# Patient Record
Sex: FEMALE | Race: BLACK OR AFRICAN AMERICAN | Employment: FULL TIME | URBAN - METROPOLITAN AREA
[De-identification: names, ages, dates, MRNs, and addresses within clinical notes are randomized per-mention and may not be internally consistent; named-entity substitution may affect disease eponyms.]

---

## 2024-11-18 ENCOUNTER — OFFICE VISIT (OUTPATIENT)
Age: 34
End: 2024-11-18

## 2024-11-18 VITALS
WEIGHT: 198.1 LBS | OXYGEN SATURATION: 98 % | TEMPERATURE: 98.2 F | SYSTOLIC BLOOD PRESSURE: 150 MMHG | DIASTOLIC BLOOD PRESSURE: 98 MMHG | HEIGHT: 63 IN | BODY MASS INDEX: 35.1 KG/M2 | HEART RATE: 94 BPM

## 2024-11-18 DIAGNOSIS — I10 ELEVATED BLOOD PRESSURE READING IN OFFICE WITH DIAGNOSIS OF HYPERTENSION: ICD-10-CM

## 2024-11-18 DIAGNOSIS — Z02.1 PRE-EMPLOYMENT HEALTH SCREENING EXAMINATION: Primary | ICD-10-CM

## 2024-11-18 ASSESSMENT — ENCOUNTER SYMPTOMS
BACK PAIN: 0
SHORTNESS OF BREATH: 0
CHEST TIGHTNESS: 0

## 2024-11-19 NOTE — PROGRESS NOTES
Janel Jensen (:  1990) is a 34 y.o. female,New patient, here for evaluation of the following chief complaint(s):  Employment Physical      ASSESSMENT/PLAN:    ICD-10-CM    1. Pre-employment health screening examination  Z02.1       2. Elevated blood pressure reading in office with diagnosis of hypertension  I10 Non [facility] - External Referral To Primary Care        Patient had a pre-employment physical performed while in the clinic today. Proof of tdap and MMR shown. History of HTN. BP is elevated at this time, but patient does not take her prescribed medication. Explained to her that she should discuss not liking her medication with her PCP to determine if she can change it, but that it is important for her to bring her BP down. 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 Paperwork was filled out, scanned into patient's chart, and returned to patient. Denies questions at this time.    Education and handout provided on diagnosis and management of symptoms.   AVS reviewed with patient. Follow up as needed in UC or with PCP for new or worsening symptoms.   Return if symptoms worsen or fail to improve.    SUBJECTIVE/OBJECTIVE:  Patient presents to the urgent care for a pre-employment physical for a . She has a history of HTN and anemia. She does not take her BP medication. Denies any concerns that would keep her from performing this job.      History provided by:  Patient   used: No        Vitals:    24 [phone] 24 194   BP: (!) 159/121 (!) 150/98   Site: Left Upper Arm    Position: Sitting    Cuff Size: Medium Adult    Pulse: 94    Temp: 98.2 °F (36.8 °C)    TempSrc: Oral    SpO2: 98%    Weight: 89.9 kg (198 lb 1.6 oz)    Height: 1.6 m (5' 3\")        Review of Systems   Respiratory:  Negative for chest tightness and shortness of breath.    Cardiovascular:  Negative for chest pain.   Musculoskeletal:  Negative for back pain.